# Patient Record
Sex: FEMALE | Race: ASIAN | URBAN - METROPOLITAN AREA
[De-identification: names, ages, dates, MRNs, and addresses within clinical notes are randomized per-mention and may not be internally consistent; named-entity substitution may affect disease eponyms.]

---

## 2021-02-05 ENCOUNTER — FOLLOW UP ESTABLISHED (OUTPATIENT)
Dept: URBAN - METROPOLITAN AREA CLINIC 51 | Facility: CLINIC | Age: 31
End: 2021-02-05
Payer: COMMERCIAL

## 2021-02-05 DIAGNOSIS — H04.123 TEAR FILM INSUFFICIENCY OF BILATERAL LACRIMAL GLANDS: ICD-10-CM

## 2021-02-05 DIAGNOSIS — H52.13 MYOPIA, BILATERAL: Primary | ICD-10-CM

## 2021-02-05 PROCEDURE — 92004 COMPRE OPH EXAM NEW PT 1/>: CPT | Performed by: OPTOMETRIST

## 2021-02-05 ASSESSMENT — INTRAOCULAR PRESSURE
OD: 18
OS: 19

## 2021-02-05 ASSESSMENT — KERATOMETRY
OS: 23.52
OD: 23.25

## 2021-02-05 ASSESSMENT — VISUAL ACUITY
OD: 20/20
OS: 20/20

## 2024-06-18 ENCOUNTER — APPOINTMENT (RX ONLY)
Dept: URBAN - METROPOLITAN AREA CLINIC 158 | Facility: CLINIC | Age: 34
Setting detail: DERMATOLOGY
End: 2024-06-18

## 2024-06-18 DIAGNOSIS — L20.89 OTHER ATOPIC DERMATITIS: ICD-10-CM | Status: INADEQUATELY CONTROLLED

## 2024-06-18 PROCEDURE — 99204 OFFICE O/P NEW MOD 45 MIN: CPT

## 2024-06-18 PROCEDURE — ? PRESCRIPTION

## 2024-06-18 PROCEDURE — ? PATIENT SPECIFIC COUNSELING

## 2024-06-18 PROCEDURE — ? EDUCATIONAL RESOURCES PROVIDED

## 2024-06-18 PROCEDURE — ? COUNSELING

## 2024-06-18 RX ORDER — TACROLIMUS 1 MG/G
1 OINTMENT TOPICAL
Qty: 60 | Refills: 6 | Status: ERX | COMMUNITY
Start: 2024-06-18

## 2024-06-18 RX ADMIN — TACROLIMUS 1: 1 OINTMENT TOPICAL at 00:00

## 2024-06-18 ASSESSMENT — LOCATION DETAILED DESCRIPTION DERM
LOCATION DETAILED: LEFT SCAPHA
LOCATION DETAILED: RIGHT CENTRAL EYEBROW
LOCATION DETAILED: RIGHT INFERIOR LATERAL NECK
LOCATION DETAILED: LEFT INFERIOR LATERAL NECK
LOCATION DETAILED: LEFT CENTRAL EYEBROW

## 2024-06-18 ASSESSMENT — LOCATION SIMPLE DESCRIPTION DERM
LOCATION SIMPLE: LEFT EAR
LOCATION SIMPLE: LEFT ANTERIOR NECK
LOCATION SIMPLE: LEFT EYEBROW
LOCATION SIMPLE: RIGHT EYEBROW
LOCATION SIMPLE: RIGHT ANTERIOR NECK

## 2024-06-18 ASSESSMENT — LOCATION ZONE DERM
LOCATION ZONE: EAR
LOCATION ZONE: NECK
LOCATION ZONE: FACE

## 2024-06-18 NOTE — PROCEDURE: PATIENT SPECIFIC COUNSELING
The patient has had a recurring itchy rash for the past year. Areas of involvement include her face, scalp, eyelid, ears, neck, and arms.  She had seen Dr. Faria at [facility].  Prior treatments have included hydrocortisone 2.5% cream, clobetasol 0.05% cream, and Eucrisa ointment.  These medications help but the rash keeps recurring.  She believes she may have seasonal environmental allergies. She does take OTC non-sedating antihistamines. The exam shows findings consistent with atopic dermatitis. I discussed with the patient that there is treatment but no one-time cure for atopic dermatitis. Since most of her area of involvement includes the face and neck, I recommend starting TYSHAWN; I will start her on tacrolimus 0.1% ointment.  She inquires about Dupixent as it was recommended by her friend.  I told her that Dupixent is usually used for severe widespread atopic dermatitis that does not respond to topical medication and she would need to stay on the medication indefinitely to keep the condition under control.  Dupixent brochure was given for her to review.
Detail Level: Simple